# Patient Record
Sex: MALE | Race: BLACK OR AFRICAN AMERICAN | NOT HISPANIC OR LATINO | URBAN - METROPOLITAN AREA
[De-identification: names, ages, dates, MRNs, and addresses within clinical notes are randomized per-mention and may not be internally consistent; named-entity substitution may affect disease eponyms.]

---

## 2020-01-01 ENCOUNTER — EMERGENCY (EMERGENCY)
Facility: HOSPITAL | Age: 60
LOS: 0 days | End: 2020-12-20
Attending: EMERGENCY MEDICINE
Payer: SELF-PAY

## 2020-01-01 VITALS — HEIGHT: 72 IN | WEIGHT: 169.98 LBS

## 2020-01-01 DIAGNOSIS — I46.9 CARDIAC ARREST, CAUSE UNSPECIFIED: ICD-10-CM

## 2020-01-01 LAB — GLUCOSE BLDC GLUCOMTR-MCNC: 64 MG/DL — LOW (ref 70–99)

## 2020-01-01 PROCEDURE — 99285 EMERGENCY DEPT VISIT HI MDM: CPT

## 2020-12-20 NOTE — ED ADULT TRIAGE NOTE - OTHER COMPLAINTS
10:48 PM Patient seems upset stating \"I need my anti seizure medication,\" patient tearful stating \"I just want my medication, so I can go to bed.,\" Informed patient that she is nothing by mouth, Patient states \"well, I hope they don't try to give me IV ativan, because that isn't the same as my xanax I take, Called Dr. Soha Owens MD will review chart and place orders accordingly. Will continue to monitor patient. Came  With left sided chest pain since removal of Right sided  TDC & putting  A stent  & angioplasty of AVF by [facility] vascular center yesterday. . Pain is reproducible , troponin is slightly elevated probably non significant,slight prominent ST  segment depression compared to Old EKG, Cardiology has stopped Plavix Given recurrent  Rectal bleeding from Radiation Proctitis, GI cauterized those bleeding. . H/H has dropped slightly, last dialysis was on 06/19/18 except 90 mints of dialysis today at out patient center. Will arrange his dialysis here, needs to get cardiologist's opinion regarding his chest pain. Full cardiac arrest

## 2020-12-20 NOTE — ED PROVIDER NOTE - OBJECTIVE STATEMENT
61 yo male with no known pmhx bibems in cardiac arrest. As per EMS, patient was c/o constipation the past few days and taking stool softeners. Today, patient was at his girlfriends house, watching football, and he collapsed on the couch. Family started CPR, EMS arrived and patient found in PEA. Patient with episode of V tach and was shocked once. Patient remained pulseless. EMS intubated in the field. Patient was given epi x 9 and bicarb x 2 and calcium x 1. Patient remained pulseless and arrived in ED pulseless in PEA. Patient unresponsive

## 2020-12-20 NOTE — ED ADULT TRIAGE NOTE - CHIEF COMPLAINT QUOTE
Yesterday had a headache, slummed over watching football today, FS , CPR in progress, given 1 shock  by EMS Yesterday had a headache, slummed over watching football today, FS 64 by EMS given 1 amp Dextrose by EMS , CPR in progress, given 1 shock  by EMS

## 2020-12-20 NOTE — ED PROVIDER NOTE - PROGRESS NOTE DETAILS
patient arrived in cardiac arrest; continued acls; patient given another 3 epi in ED; remained pulseless; asystolic on monitor  patient unresponsive over 60 minutes without pulse  time of death [id number] family notified, son at bedside, Mane  notified me and d/w Dakota, who accepts patient as ME case; family informed

## 2020-12-20 NOTE — ED ADULT NURSE NOTE - OBJECTIVE STATEMENT
Pt was BIBA in cardiac arrest intubated in the field, ACLS in progress. 9 epi, 2 bicarb and 1 calcium given in the field

## 2020-12-20 NOTE — ED ADULT NURSE NOTE - CHIEF COMPLAINT QUOTE
Yesterday had a headache, slummed over watching football today, FS 64 by EMS given 1 amp Dextrose by EMS , CPR in progress, given 1 shock  by EMS